# Patient Record
Sex: MALE | Race: WHITE | NOT HISPANIC OR LATINO
[De-identification: names, ages, dates, MRNs, and addresses within clinical notes are randomized per-mention and may not be internally consistent; named-entity substitution may affect disease eponyms.]

---

## 2017-12-11 PROBLEM — Z00.00 ENCOUNTER FOR PREVENTIVE HEALTH EXAMINATION: Status: ACTIVE | Noted: 2017-12-11

## 2017-12-26 ENCOUNTER — TRANSCRIPTION ENCOUNTER (OUTPATIENT)
Age: 62
End: 2017-12-26

## 2018-01-02 ENCOUNTER — APPOINTMENT (OUTPATIENT)
Dept: ENDOCRINOLOGY | Facility: CLINIC | Age: 63
End: 2018-01-02
Payer: COMMERCIAL

## 2018-01-02 VITALS
BODY MASS INDEX: 26.52 KG/M2 | DIASTOLIC BLOOD PRESSURE: 74 MMHG | WEIGHT: 165 LBS | HEART RATE: 69 BPM | OXYGEN SATURATION: 98 % | HEIGHT: 66 IN | SYSTOLIC BLOOD PRESSURE: 120 MMHG

## 2018-01-02 DIAGNOSIS — Z85.46 PERSONAL HISTORY OF MALIGNANT NEOPLASM OF PROSTATE: ICD-10-CM

## 2018-01-02 DIAGNOSIS — Z87.891 PERSONAL HISTORY OF NICOTINE DEPENDENCE: ICD-10-CM

## 2018-01-02 DIAGNOSIS — Z84.1 FAMILY HISTORY OF DISORDERS OF KIDNEY AND URETER: ICD-10-CM

## 2018-01-02 PROCEDURE — 99244 OFF/OP CNSLTJ NEW/EST MOD 40: CPT

## 2018-01-02 RX ORDER — TAMSULOSIN HYDROCHLORIDE 0.4 MG/1
0.4 CAPSULE ORAL
Refills: 0 | Status: ACTIVE | COMMUNITY

## 2018-01-02 RX ORDER — FOLIC ACID 1 MG/1
1 TABLET ORAL
Refills: 0 | Status: ACTIVE | COMMUNITY

## 2018-01-02 RX ORDER — MULTIVIT-MIN/FOLIC/VIT K/LYCOP 400-300MCG
50 MCG TABLET ORAL
Refills: 0 | Status: ACTIVE | COMMUNITY

## 2018-01-02 RX ORDER — ASCORBIC ACID 500 MG
500 TABLET ORAL
Refills: 0 | Status: ACTIVE | COMMUNITY

## 2018-01-02 RX ORDER — ROSUVASTATIN CALCIUM 10 MG/1
10 TABLET, FILM COATED ORAL
Refills: 0 | Status: ACTIVE | COMMUNITY
Start: 2018-01-02

## 2018-01-02 RX ORDER — CHLORHEXIDINE GLUCONATE 4 %
1000 LIQUID (ML) TOPICAL
Refills: 0 | Status: ACTIVE | COMMUNITY

## 2018-01-02 RX ORDER — MULTIVITAMIN
TABLET ORAL
Refills: 0 | Status: ACTIVE | COMMUNITY

## 2018-10-16 ENCOUNTER — APPOINTMENT (OUTPATIENT)
Dept: ENDOCRINOLOGY | Facility: CLINIC | Age: 63
End: 2018-10-16

## 2019-02-07 ENCOUNTER — APPOINTMENT (OUTPATIENT)
Dept: ENDOCRINOLOGY | Facility: CLINIC | Age: 64
End: 2019-02-07
Payer: COMMERCIAL

## 2019-02-07 VITALS
SYSTOLIC BLOOD PRESSURE: 130 MMHG | OXYGEN SATURATION: 98 % | WEIGHT: 170 LBS | HEIGHT: 66 IN | HEART RATE: 70 BPM | DIASTOLIC BLOOD PRESSURE: 70 MMHG | BODY MASS INDEX: 27.32 KG/M2

## 2019-02-07 DIAGNOSIS — R73.03 PREDIABETES.: ICD-10-CM

## 2019-02-07 DIAGNOSIS — K90.0 CELIAC DISEASE: ICD-10-CM

## 2019-02-07 PROCEDURE — ZZZZZ: CPT

## 2019-02-07 PROCEDURE — 99214 OFFICE O/P EST MOD 30 MIN: CPT | Mod: 25

## 2019-02-07 PROCEDURE — 77080 DXA BONE DENSITY AXIAL: CPT

## 2019-02-07 RX ORDER — FERROUS SULFATE 137(45) MG
TABLET, EXTENDED RELEASE ORAL
Refills: 0 | Status: DISCONTINUED | COMMUNITY
End: 2019-02-07

## 2019-02-07 RX ORDER — METFORMIN HYDROCHLORIDE 500 MG/1
500 TABLET, COATED ORAL
Refills: 0 | Status: DISCONTINUED | COMMUNITY
End: 2019-02-07

## 2019-02-08 NOTE — ASSESSMENT
[Bisphosphonate Therapy] : Risks  and benefits of bisphosphonate therapy were  discussed with the patient including gastroesophageal irritation, osteonecrosis of the jaw, and atypical femur fractures, and acute phase reaction [Bisphosphonates] : The patient was instructed to take bisphosphonates on an empty stomach with a full glass of water,and wait at least 30 minutes before eating or lying down [FreeTextEntry1] : 63 year-old male with \par \par 1. Osteoporosis complicated by celiac disease: pt began a gluten-free diet with marked improvement. A routine bone density test showed fairly low value in the spine and hip, consistent with osteoporosis. I advised pt that he is at increased risk for osteoporosis related fx. However, many pts with celiac disease show marked improvement in bone density just on a gluten-free diet without specific bone medical therapy, did not start osteoporosis rx in 2017. Repeat BMD 2/2019 off rx indicates improvement in the hip, stable osteoporosis in the spine, and severely low density in the proximal radius not previously done. \par \par Recommended initiating medical therapy to stop bone loss, increase BMD, and thus reduce risk of future fx. Standard bone loss prevention medications were reviewed (bisphosphonates). He would benefit from bisphosphonate therapy with the expectation of a drug holiday within 5 years. Risks and benefits of bisphosphonate therapy were discussed to include decreased serum Ca, GI irritation, ONJ, atypical femur fx, and APR. Pt can consider IV Reclast or Prolia if she experiences UGI sx. All questions were answered. Pt understands and agrees to Actonel. Prescription sent out. \par \par 2. H/o prediabetes: Prior A1c 5.4% on metformin now d/c. Now 5.8%. Pt runs frequently. No major wt loss.  \par \par Labs for Ca and Vit D normal. f/u in 4-6 months with repeat BMD in 1 year.

## 2019-02-08 NOTE — HISTORY OF PRESENT ILLNESS
[FreeTextEntry1] : 63 year-old male with osteoporosis. \par \par Pt had a routine endoscopy which led to a dx of celiac disease. No sx of celiac disease such as bloating with diarrhea. He did begin a gluten-free diet with improvements in anemia. He does not know vitamin D levels prior to beginning the diet. Recent Vit D 37. \par A routine bone density test was performed because of celiac disease. Spine -3.5, total hip -2.7 femoral neck -2.5. Pt has no h/o previous osteoporosis fx. There is no other unusual risk factors for osteoporosis.\par \par Prior medical hx is notable for prediabetes being treated with metformin. Previous hemoglobin A1c 5.8 now 5.4. d/c metformin. No wt loss. Pt runs frequently. \par \par H/o prostate CA treated with radioactive seed implants 2016. [Disordered Eating] : no past or present history of disordered eating [Taking Steroids] : no past or present history of taking steroids [Kidney Stones] : no history of kidney stones [Family History of Osteoporosis] : no family history of osteoporosis [Family History of Hip Fracture] : no family history of hip fracture [Hyperparathyroidism] : no hyperparathyroidism [Previous Fragility Fracture] : no previous fragility fracture

## 2019-02-08 NOTE — END OF VISIT
[FreeTextEntry3] : I, Marsha Roe, authored this note working as a medical scribe for Dr. Gonzalez.  02/07/2019.  3:45PM.

## 2019-02-08 NOTE — PROCEDURE
[FreeTextEntry1] : Bone mineral density: 02/07/2019 \par Indication: vs. outside study Oct 2017\par Spine: -3.4 osteoporosis, no significant change \par Total hip: -2.2 osteopenia (prior -2.7)\par Femoral neck: -2.3 osteopenia (prior -2.5)\par Proximal radius: -3.8 osteoporosis, no prior

## 2019-08-05 ENCOUNTER — TRANSCRIPTION ENCOUNTER (OUTPATIENT)
Age: 64
End: 2019-08-05

## 2019-08-08 ENCOUNTER — APPOINTMENT (OUTPATIENT)
Dept: ENDOCRINOLOGY | Facility: CLINIC | Age: 64
End: 2019-08-08
Payer: COMMERCIAL

## 2019-08-08 VITALS
WEIGHT: 162 LBS | HEART RATE: 72 BPM | BODY MASS INDEX: 26.03 KG/M2 | HEIGHT: 66 IN | DIASTOLIC BLOOD PRESSURE: 80 MMHG | SYSTOLIC BLOOD PRESSURE: 120 MMHG | OXYGEN SATURATION: 98 %

## 2019-08-08 DIAGNOSIS — M81.0 AGE-RELATED OSTEOPOROSIS W/OUT CURRENT PATHOLOGICAL FRACTURE: ICD-10-CM

## 2019-08-08 PROCEDURE — 99214 OFFICE O/P EST MOD 30 MIN: CPT

## 2019-08-08 RX ORDER — RISEDRONATE SODIUM 150 MG/1
150 TABLET, FILM COATED ORAL
Qty: 3 | Refills: 3 | Status: DISCONTINUED | COMMUNITY
Start: 2019-02-07 | End: 2019-08-08

## 2019-08-08 NOTE — ASSESSMENT
[Bisphosphonates] : The patient was instructed to take bisphosphonates on an empty stomach with a full glass of water,and wait at least 30 minutes before eating or lying down [Bisphosphonate Therapy] : Risks  and benefits of bisphosphonate therapy were  discussed with the patient including gastroesophageal irritation, osteonecrosis of the jaw, and atypical femur fractures, and acute phase reaction [FreeTextEntry1] : 63 year-old male with \par \par 1. Osteoporosis complicated by celiac disease: pt began a gluten-free diet with marked improvement. A routine bone density test showed fairly low value in the spine and hip, consistent with osteoporosis. I advised pt that he is at increased risk for osteoporosis related fx. However, many pts with celiac disease show marked improvement in bone density just on a gluten-free diet without specific bone medical therapy, did not start osteoporosis rx in 2017. Repeat BMD 2/2019 off rx indicates improvement in the hip, stable osteoporosis in the spine, and severely low density in the proximal radius not previously done. BMD 07/2019, shows no significant change in spine: -3.4\par Total hip and Femoral neck c/w osteopenia, proximal radius and spine c/w osteoporosis.\par \par Pt did not tolerate Actonel due to non-specific aches. Recommended Prolia. Pt has decided against any medical therapy for osteoporosis  Risk of fracture if untreated reviewed in detailed. Pt again declines. Rx. \par \par 2. H/o prediabetes: Prior A1c 5.4% on metformin now d/c.\par \par F/u in 1 year with repeat BMD.

## 2019-08-08 NOTE — END OF VISIT
[FreeTextEntry3] : This note was authored by Zach Ramos, working as a medical scribe for Dr. Williams Gonzalez. 08/08/2019 This note was authored by the medical scribe for me. I have reviewed, edited, and revised the note as needed. I am in agreement with the exam findings, imaging findings, and treatment plan.  Williams Gonzalez MD

## 2019-08-08 NOTE — HISTORY OF PRESENT ILLNESS
[FreeTextEntry1] : 63 year-old male with osteoporosis. \par \par Pt had a routine endoscopy which led to a dx of celiac disease. No sx of celiac disease such as bloating with diarrhea. He did begin a gluten-free diet with improvements in anemia. A routine bone density test was performed because of celiac disease. Spine -3.5, total hip -2.7 femoral neck -2.5. Pt has no h/o previous osteoporosis fx.\par \par Tried Actonel stopped due to aches. Recommended Prolia, pt decided against rx.\par Prior medical hx is notable for prediabetes being treated with metformin. Previous hemoglobin A1c 5.8 now 5.4. d/c metformin. No wt loss. Pt runs frequently. \par \par H/o prostate CA treated with radioactive seed implants 2016. [Taking Steroids] : no past or present history of taking steroids [Disordered Eating] : no past or present history of disordered eating [Family History of Osteoporosis] : no family history of osteoporosis [Kidney Stones] : no history of kidney stones [Family History of Hip Fracture] : no family history of hip fracture [Hyperparathyroidism] : no hyperparathyroidism [Previous Fragility Fracture] : no previous fragility fracture

## 2019-08-08 NOTE — PHYSICAL EXAM
[Alert] : alert [No Acute Distress] : no acute distress [Well Nourished] : well nourished [Normal Sclera/Conjunctiva] : normal sclera/conjunctiva [Well Developed] : well developed [EOMI] : extra ocular movement intact [No Proptosis] : no proptosis [Normal Oropharynx] : the oropharynx was normal [Thyroid Not Enlarged] : the thyroid was not enlarged [No Thyroid Nodules] : there were no palpable thyroid nodules [No Respiratory Distress] : no respiratory distress [No Accessory Muscle Use] : no accessory muscle use [Clear to Auscultation] : lungs were clear to auscultation bilaterally [Normal Rate] : heart rate was normal  [Normal S1, S2] : normal S1 and S2 [Regular Rhythm] : with a regular rhythm [Normal Bowel Sounds] : normal bowel sounds [Not Tender] : non-tender [Soft] : abdomen soft [Not Distended] : not distended [Post Cervical Nodes] : posterior cervical nodes [Anterior Cervical Nodes] : anterior cervical nodes [Normal] : normal and non tender [No Spinal Tenderness] : no spinal tenderness [Spine Straight] : spine straight [No Stigmata of Cushings Syndrome] : no stigmata of cushings syndrome [Normal Gait] : normal gait [Normal Strength/Tone] : muscle strength and tone were normal [No Rash] : no rash [Normal Reflexes] : deep tendon reflexes were 2+ and symmetric [No Tremors] : no tremors [Oriented x3] : oriented to person, place, and time [Acanthosis Nigricans] : no acanthosis nigricans